# Patient Record
Sex: FEMALE
[De-identification: names, ages, dates, MRNs, and addresses within clinical notes are randomized per-mention and may not be internally consistent; named-entity substitution may affect disease eponyms.]

---

## 2018-08-17 ENCOUNTER — RESULT REVIEW (OUTPATIENT)
Age: 63
End: 2018-08-17

## 2022-09-14 ENCOUNTER — APPOINTMENT (OUTPATIENT)
Dept: ORTHOPEDIC SURGERY | Facility: CLINIC | Age: 67
End: 2022-09-14

## 2022-09-14 VITALS — HEIGHT: 68 IN | WEIGHT: 170 LBS | BODY MASS INDEX: 25.76 KG/M2

## 2022-09-14 DIAGNOSIS — Z78.9 OTHER SPECIFIED HEALTH STATUS: ICD-10-CM

## 2022-09-14 PROBLEM — Z00.00 ENCOUNTER FOR PREVENTIVE HEALTH EXAMINATION: Status: ACTIVE | Noted: 2022-09-14

## 2022-09-14 PROCEDURE — 73140 X-RAY EXAM OF FINGER(S): CPT | Mod: LT

## 2022-09-14 PROCEDURE — 26725 TREAT FINGER FRACTURE EACH: CPT | Mod: F3

## 2022-09-14 PROCEDURE — 29280 STRAPPING OF HAND OR FINGER: CPT | Mod: LT

## 2022-09-14 PROCEDURE — 99204 OFFICE O/P NEW MOD 45 MIN: CPT | Mod: 57,25

## 2022-09-14 NOTE — HISTORY OF PRESENT ILLNESS
[Tingling] : tingling [de-identified] : 9/14/22:  Pt was walking her dog and he started running and the leash pulled her finger.  Pt presents with a rotated left RF\par RHD [FreeTextEntry3] : 9/14/22 [FreeTextEntry4] : around 2:30 pm  [FreeTextEntry5] : patient was injured when her dog pulled the leash and she felt and heard a pop

## 2022-09-14 NOTE — IMAGING
[de-identified] : RF rotated at 45 degree angle\par Swelling and ecchymosis over proximal phalanx\par TTP over proximal phalanx\par ROM limited due to pain\par NVID [Left] : left fingers [FreeTextEntry9] : Displaced fracture of proximal phalanx

## 2022-09-14 NOTE — ASSESSMENT
[FreeTextEntry1] : The patient was advised of the diagnosis. The natural history of the pathology was explained in full to the patient in layman's terms. All questions were answered. The risks and benefits of surgical and non-surgical treatment alternatives were explained in full to the patient.\par \par Post reduction xrays show improved alignment.\par Pt will maintain finger tape.\par F/u in 1 week for repeat xray

## 2022-09-20 ENCOUNTER — APPOINTMENT (OUTPATIENT)
Dept: ORTHOPEDIC SURGERY | Facility: CLINIC | Age: 67
End: 2022-09-20

## 2022-09-20 VITALS — HEIGHT: 68 IN | WEIGHT: 170 LBS | BODY MASS INDEX: 25.76 KG/M2

## 2022-09-20 PROCEDURE — 99024 POSTOP FOLLOW-UP VISIT: CPT

## 2022-09-20 PROCEDURE — 73140 X-RAY EXAM OF FINGER(S): CPT | Mod: LT

## 2022-09-20 NOTE — ASSESSMENT
[FreeTextEntry1] : The patient was advised of the diagnosis. The natural history of the pathology was explained in full to the patient in layman's terms. All questions were answered. The risks and benefits of surgical and non-surgical treatment alternatives were explained in full to the patient.\par We reviewed the importance of finger range of motion.  We discussed that while wrist stiffness can be tolerated, finger stiffness causes grave dysfunction and is difficult to overcome once it sets in.  The patient was encouraged to engage in finger range of motion exercises.  A home exercise program was demonstrated and instructions given to begin immediately and to perform the exercises hourly.\par \par Maintain finger tape\par F/u in 2 weeks for repeat xrays\par \par Pt is comfortable with the degree of rotation and understands that the deformity will be permanent.  Pt declines surgery.

## 2022-09-20 NOTE — REASON FOR VISIT
[FreeTextEntry2] : 09/20/2022 :JAQUELIN KING , a 67 year old female, presents today for left ring finger \par

## 2022-09-20 NOTE — HISTORY OF PRESENT ILLNESS
[2] : 2 [0] : 0 [Dull/Aching] : dull/aching [Localized] : localized [Sharp] : sharp [Retired] : Work status: retired [de-identified] : 9/20/22:  Pt has been compliant with jean loops and is improving\par \par 9/14/22:  Pt was walking her dog and he started running and the leash pulled her finger.  Pt presents with a rotated left RF\par RHD [Tingling] : tingling [] : no [FreeTextEntry3] : 9/14/22 [FreeTextEntry4] : around 2:30 pm  [FreeTextEntry5] : patient was injured when her dog pulled the leash and she felt and heard a pop

## 2022-09-20 NOTE — IMAGING
[de-identified] : Left RF rotated at 20 degree angle\par improved swelling and ecchymosis over proximal phalanx\par TTP over proximal phalanx\par ROM limited due to pain\par NVID [Left] : left fingers [FreeTextEntry9] : Displaced fracture of proximal phalanx

## 2022-10-04 ENCOUNTER — APPOINTMENT (OUTPATIENT)
Dept: ORTHOPEDIC SURGERY | Facility: CLINIC | Age: 67
End: 2022-10-04

## 2022-10-04 DIAGNOSIS — S62.656A NONDISPLACED FX OF MID PHALANX OF RT LITTLE FINGER  INITIAL ENC. CLOSED FX: ICD-10-CM

## 2022-10-04 PROCEDURE — 99024 POSTOP FOLLOW-UP VISIT: CPT

## 2022-10-04 PROCEDURE — 73130 X-RAY EXAM OF HAND: CPT | Mod: LT

## 2022-10-04 NOTE — HISTORY OF PRESENT ILLNESS
[Dull/Aching] : dull/aching [Localized] : localized [Intermittent] : intermittent [Nothing helps with pain getting better] : Nothing helps with pain getting better [Retired] : Work status: retired [de-identified] : 10/4/22:  Pt is improving but still has some pain\par \par 9/20/22:  Pt has been compliant with jean loops and is improving\par \par 9/14/22:  Pt was walking her dog and he started running and the leash pulled her finger.  Pt presents with a rotated left RF\par RHD [2] : 2 [0] : 0 [Sharp] : sharp [Tingling] : tingling [] : no [FreeTextEntry3] : 9/14/22 [FreeTextEntry4] : around 2:30 pm  [FreeTextEntry5] : patient was injured when her dog pulled the leash and she felt and heard a pop

## 2022-10-04 NOTE — REASON FOR VISIT
[FreeTextEntry2] : 10/04/2022 :JAQULEIN KING , a 67 year old female, presents today for left ring finger and pinky \par

## 2022-10-04 NOTE — ASSESSMENT
[FreeTextEntry1] : The patient was advised of the diagnosis. The natural history of the pathology was explained in full to the patient in layman's terms. All questions were answered. The risks and benefits of surgical and non-surgical treatment alternatives were explained in full to the patient.\par We reviewed the importance of finger range of motion.  We discussed that while wrist stiffness can be tolerated, finger stiffness causes grave dysfunction and is difficult to overcome once it sets in.  The patient was encouraged to engage in finger range of motion exercises.  A home exercise program was demonstrated and instructions given to begin immediately and to perform the exercises hourly.\par \par Maintain finger tape\par F/u in 3 weeks for repeat xrays\par \par Pt is comfortable with the degree of rotation and understands that the deformity will be permanent.  Pt declines surgery.

## 2022-10-04 NOTE — IMAGING
[de-identified] : Left RF rotated at 20 degree angle\par improved swelling and ecchymosis over proximal phalanx\par TTP over proximal phalanx\par ROM limited due to pain\par NVID [Left] : left fingers [FreeTextEntry9] : Displaced fracture of proximal phalanx

## 2022-11-01 ENCOUNTER — APPOINTMENT (OUTPATIENT)
Dept: ORTHOPEDIC SURGERY | Facility: CLINIC | Age: 67
End: 2022-11-01

## 2022-11-01 VITALS — WEIGHT: 170 LBS | HEIGHT: 68 IN | BODY MASS INDEX: 25.76 KG/M2

## 2022-11-01 DIAGNOSIS — S62.615A DISPLACED FRACTURE OF PROXIMAL PHALANX OF LEFT RING FINGER, INITIAL ENCOUNTER FOR CLOSED FRACTURE: ICD-10-CM

## 2022-11-01 PROCEDURE — 99024 POSTOP FOLLOW-UP VISIT: CPT

## 2022-11-01 PROCEDURE — 73140 X-RAY EXAM OF FINGER(S): CPT | Mod: LT

## 2022-11-01 NOTE — IMAGING
[Left] : left fingers [de-identified] : Left RF with no significant angular/rotational deformity noted on clinical exam.\par resolved swelling and ecchymosis over proximal phalanx\par There is no TTP over proximal phalanx\par ROM mildly limited in flexion to the DIP joint only\par NVID/FAROM.\par  and intrinsic strength is 5/5 [FreeTextEntry9] : Displaced fracture of proximal phalanx with solid callous formation

## 2022-11-01 NOTE — HISTORY OF PRESENT ILLNESS
[2] : 2 [0] : 0 [Dull/Aching] : dull/aching [Localized] : localized [Sharp] : sharp [Tingling] : tingling [Intermittent] : intermittent [Nothing helps with pain getting better] : Nothing helps with pain getting better [Retired] : Work status: retired [de-identified] : 11/1/2022: Pt here for 7 week f/u s/p left RF P1 fx. Pt has maintained jean tape and she reports no pain. \par \par 10/4/22:  Pt is improving but still has some pain\par \par 9/20/22:  Pt has been compliant with jean loops and is improving\par \par 9/14/22:  Pt was walking her dog and he started running and the leash pulled her finger.  Pt presents with a rotated left RF\par RHD [] : no [FreeTextEntry3] : 9/14/22 [FreeTextEntry4] : around 2:30 pm  [FreeTextEntry5] : patient was injured when her dog pulled the leash and she felt and heard a pop